# Patient Record
Sex: MALE | Race: WHITE | Employment: PART TIME | URBAN - METROPOLITAN AREA
[De-identification: names, ages, dates, MRNs, and addresses within clinical notes are randomized per-mention and may not be internally consistent; named-entity substitution may affect disease eponyms.]

---

## 2018-10-13 ENCOUNTER — HOSPITAL ENCOUNTER (EMERGENCY)
Age: 28
Discharge: HOME | End: 2018-10-13
Attending: EMERGENCY MEDICINE | Admitting: EMERGENCY MEDICINE
Payer: SELF-PAY

## 2018-10-13 VITALS
TEMPERATURE: 98.2 F | OXYGEN SATURATION: 99 % | HEART RATE: 88 BPM | RESPIRATION RATE: 16 BRPM | DIASTOLIC BLOOD PRESSURE: 68 MMHG | SYSTOLIC BLOOD PRESSURE: 115 MMHG | WEIGHT: 140 LBS

## 2018-10-13 DIAGNOSIS — K64.9 HEMORRHOIDS, UNSPECIFIED HEMORRHOID TYPE: Primary | ICD-10-CM

## 2018-10-13 PROCEDURE — 99282 EMERGENCY DEPT VISIT SF MDM: CPT

## 2018-10-13 RX ORDER — TRAMADOL HYDROCHLORIDE 50 MG/1
50 TABLET ORAL
Qty: 5 TAB | Refills: 0 | Status: SHIPPED | OUTPATIENT
Start: 2018-10-13 | End: 2020-07-17

## 2018-10-13 RX ORDER — HYDROCORTISONE 10 MG/G
CREAM TOPICAL AS NEEDED
Qty: 30 G | Refills: 0 | Status: SHIPPED | OUTPATIENT
Start: 2018-10-13 | End: 2020-07-17

## 2018-10-13 RX ORDER — DOCUSATE SODIUM 100 MG/1
100 CAPSULE, LIQUID FILLED ORAL 2 TIMES DAILY
Qty: 20 CAP | Refills: 0 | Status: SHIPPED | OUTPATIENT
Start: 2018-10-13 | End: 2018-10-23

## 2018-10-13 NOTE — DISCHARGE INSTRUCTIONS

## 2018-10-13 NOTE — ED TRIAGE NOTES
Pt states he has been dealing with a hemmorhiod for over a month now,  Having pain,  Painful to sit,  And has seen blood in his stool

## 2018-10-13 NOTE — ED PROVIDER NOTES
EMERGENCY DEPARTMENT HISTORY AND PHYSICAL EXAM 
 
2:37 AM 
 
 
Date: 10/13/2018 Patient Name: Esme Carey History of Presenting Illness Chief Complaint Patient presents with  Hemorrhoids History Provided By: Patient Chief Complaint: Hemorrhoids Duration:  1 Month Timing:  Constant and worsening Location:  Rectum Severity: Moderate Modifying Factors: Not improved by Ibuprofen and Preparation H with Lidocaine Associated Symptoms: Patient denies any other associated symptoms or complaints. Additional History (Context): Esme Carey is a 33 y.o. male with no significant pertinent past medical history who presents with c/o hemorrhoids onset 1 month ago. Patient describes his hemorrhoids as constant, worsening, located in the rectum, moderate, and not improved by Ibuprofen and Preparation H with Lidocaine. He has pain when sitting down and has seen blood in his stool. He has never had this before. Patient denies use of daily medications, alcohol use, and recreational drug use. He does smoke cigarettes. Patient has been mostly eating fast food and does not take any stool softeners. He says he usually has hard BMs and has to strain a lot when defecating. He frequently sees small amounts of bright red blood in the toilet bowl. Patient denies any other associated symptoms or complaints. PCP: None Current Outpatient Prescriptions Medication Sig Dispense Refill  hydrocortisone (PROCTOCORT) 1 % rectal cream Insert  into rectum as needed for Hemorrhoids. No more than twice per day. 30 g 0  
 docusate sodium (COLACE) 100 mg capsule Take 1 Cap by mouth two (2) times a day for 10 days. 20 Cap 0  
 traMADol (ULTRAM) 50 mg tablet Take 1 Tab by mouth every eight (8) hours as needed for Pain. Max Daily Amount: 150 mg. 5 Tab 0 Past History Past Medical History: 
History reviewed. No pertinent past medical history. Past Surgical History: History reviewed. No pertinent surgical history. Family History: 
History reviewed. No pertinent family history. Social History: 
Social History Substance Use Topics  Smoking status: Current Every Day Smoker  Smokeless tobacco: Never Used  Alcohol use Yes Allergies: Allergies Allergen Reactions  Pcn [Penicillins] Hives Review of Systems Review of Systems Constitutional: Negative for activity change, fatigue and fever. HENT: Negative for congestion and rhinorrhea. Eyes: Negative for visual disturbance. Respiratory: Negative for shortness of breath. Cardiovascular: Negative for chest pain and palpitations. Gastrointestinal: Positive for anal bleeding. Negative for abdominal pain, diarrhea, nausea and vomiting. Genitourinary: Negative for dysuria and hematuria. Musculoskeletal: Negative for back pain. Skin: Negative for rash. Neurological: Negative for dizziness, weakness and light-headedness. All other systems reviewed and are negative. Physical Exam  
 
Visit Vitals  /68 (BP 1 Location: Left arm)  Pulse 88  Temp 98.2 °F (36.8 °C)  Resp 16  Wt 63.5 kg (140 lb)  SpO2 99% Physical Exam  
Constitutional: He appears well-developed and well-nourished. He appears distressed. HENT:  
Head: Normocephalic and atraumatic. Cardiovascular: Normal rate and regular rhythm. Pulmonary/Chest: Breath sounds normal.  
Abdominal: Soft. He exhibits no distension. There is no tenderness. Genitourinary: Rectal exam shows no external hemorrhoid and no fissure. Genitourinary Comments: No visible external hemorrhoid. Digital exam deferred due to patient discomfort Musculoskeletal: He exhibits no edema or deformity. Diagnostic Study Results Labs - No results found for this or any previous visit (from the past 12 hour(s)). Radiologic Studies - No orders to display No results found. Medical Decision Making I am the first provider for this patient. I reviewed the vital signs, available nursing notes, past medical history, past surgical history, family history and social history. Vital Signs-Reviewed the patient's vital signs. Pulse Oximetry Analysis -  99% on room air (normal) Records Reviewed: Nursing Notes (Time of Review: 2:37 AM) ED Course: Progress Notes, Reevaluation, and Consults: MDM -- suspect internal hemorrhoids based on history. No visible external hemorrhoid. Advised on need to adhere to high fiber diet and fluid intake. Will add stool softener and topical steroid to his regimen, as well as Tramadol for severe breakthrough pain. Referred to colorectal surgery for further eval, per patient's request. 
 
Diagnosis Clinical Impression: 1. Hemorrhoids, unspecified hemorrhoid type Disposition: discharge Follow-up Information Follow up With Details Comments Contact Info Andie Presley MD Schedule an appointment as soon as possible for a visit This is a referral to a colorectal surgeon for further evaluation [address] 
[phone] Patient's Medications Start Taking DOCUSATE SODIUM (COLACE) 100 MG CAPSULE    Take 1 Cap by mouth two (2) times a day for 10 days. HYDROCORTISONE (PROCTOCORT) 1 % RECTAL CREAM    Insert  into rectum as needed for Hemorrhoids. No more than twice per day. TRAMADOL (ULTRAM) 50 MG TABLET    Take 1 Tab by mouth every eight (8) hours as needed for Pain. Max Daily Amount: 150 mg. Continue Taking No medications on file These Medications have changed No medications on file Stop Taking No medications on file  
 
_______________________________ Attestations: 
Scribe Attestation Amada Quinones acting as a scribe for and in the presence of Gena Castro MD     
October 13, 2018 at 2:37 AM 
    
Provider Attestation: I personally performed the services described in the documentation, reviewed the documentation, as recorded by the scribe in my presence, and it accurately and completely records my words and actions. October 13, 2018 at 2:37 AM - Francisco Lopez MD   
_______________________________

## 2019-01-15 ENCOUNTER — HOSPITAL ENCOUNTER (EMERGENCY)
Age: 29
Discharge: HOME | End: 2019-01-15
Attending: EMERGENCY MEDICINE
Payer: COMMERCIAL

## 2019-01-15 VITALS
DIASTOLIC BLOOD PRESSURE: 80 MMHG | OXYGEN SATURATION: 97 % | TEMPERATURE: 98.5 F | HEART RATE: 88 BPM | SYSTOLIC BLOOD PRESSURE: 123 MMHG | RESPIRATION RATE: 18 BRPM

## 2019-01-15 DIAGNOSIS — B35.6 JOCK ITCH: ICD-10-CM

## 2019-01-15 DIAGNOSIS — J02.9 SORE THROAT: Primary | ICD-10-CM

## 2019-01-15 PROCEDURE — 99282 EMERGENCY DEPT VISIT SF MDM: CPT

## 2019-01-15 PROCEDURE — 87081 CULTURE SCREEN ONLY: CPT

## 2019-01-15 RX ORDER — ECONAZOLE NITRATE 10 MG/G
CREAM TOPICAL DAILY
Qty: 15 G | Refills: 0 | Status: SHIPPED | OUTPATIENT
Start: 2019-01-15 | End: 2019-01-29

## 2019-01-15 NOTE — DISCHARGE INSTRUCTIONS
Patient Education        Sore Throat: Care Instructions  Your Care Instructions    Infection by bacteria or a virus causes most sore throats. Cigarette smoke, dry air, air pollution, allergies, and yelling can also cause a sore throat. Sore throats can be painful and annoying. Fortunately, most sore throats go away on their own. If you have a bacterial infection, your doctor may prescribe antibiotics. Follow-up care is a key part of your treatment and safety. Be sure to make and go to all appointments, and call your doctor if you are having problems. It's also a good idea to know your test results and keep a list of the medicines you take. How can you care for yourself at home? · If your doctor prescribed antibiotics, take them as directed. Do not stop taking them just because you feel better. You need to take the full course of antibiotics. · Gargle with warm salt water once an hour to help reduce swelling and relieve discomfort. Use 1 teaspoon of salt mixed in 1 cup of warm water. · Take an over-the-counter pain medicine, such as acetaminophen (Tylenol), ibuprofen (Advil, Motrin), or naproxen (Aleve). Read and follow all instructions on the label. · Be careful when taking over-the-counter cold or flu medicines and Tylenol at the same time. Many of these medicines have acetaminophen, which is Tylenol. Read the labels to make sure that you are not taking more than the recommended dose. Too much acetaminophen (Tylenol) can be harmful. · Drink plenty of fluids. Fluids may help soothe an irritated throat. Hot fluids, such as tea or soup, may help decrease throat pain. · Use over-the-counter throat lozenges to soothe pain. Regular cough drops or hard candy may also help. These should not be given to young children because of the risk of choking. · Do not smoke or allow others to smoke around you. If you need help quitting, talk to your doctor about stop-smoking programs and medicines.  These can increase your chances of quitting for good. · Use a vaporizer or humidifier to add moisture to your bedroom. Follow the directions for cleaning the machine. When should you call for help? Call your doctor now or seek immediate medical care if:    · You have new or worse trouble swallowing.     · Your sore throat gets much worse on one side.    Watch closely for changes in your health, and be sure to contact your doctor if you do not get better as expected. Where can you learn more? Go to http://kat-ruben.info/. Enter [phone] [id number] [phone] in the search box to learn more about \"Sore Throat: Care Instructions. \"  Current as of: 2018  Content Version: 11.8  © 7338-5292 [employer]. Care instructions adapted under license by [employer] (which disclaims liability or warranty for this information). If you have questions about a medical condition or this instruction, always ask your healthcare professional. [place] [address] any warranty or liability for your use of this information. [employer] Activation    Thank you for requesting access to [employer]. Please follow the instructions below to securely access and download your online medical record. [employer] allows you to send messages to your doctor, view your test results, renew your prescriptions, schedule appointments, and more. How Do I Sign Up? 1. In your internet browser, go to [email or web address]  2. Click on the First Time User? Click Here link in the Sign In box. You will be redirect to the New Member Sign Up page. 3. Enter your [employer] Access Code exactly as it appears below. You will not need to use this code after youve completed the sign-up process. If you do not sign up before the expiration date, you must request a new code. [employer] Access Code: [email or web address]  Expires: 3/1/2019  1:37 AM (This is the date your [employer]ter access code will )    4.  Enter the last four digits of your Social Security Number (xxxx) and Date of Birth (mm/dd/yyyy) as indicated and click Submit. You will be taken to the next sign-up page. 5. Create a [employer] ID. This will be your [employer] login ID and cannot be changed, so think of one that is secure and easy to remember. 6. Create a [employer] password. You can change your password at any time. 7. Enter your Password Reset Question and Answer. This can be used at a later time if you forget your password. 8. Enter your e-mail address. You will receive e-mail notification when new information is available in [address] 9. Click Sign Up. You can now view and download portions of your medical record. 10. Click the Download Summary menu link to download a portable copy of your medical information. Additional Information    If you have questions, please visit the Frequently Asked Questions section of the [employer] website at [email or web address] Remember, [employer] is NOT to be used for urgent needs. For medical emergencies, dial 911. Complete all medications as prescribed. Follow-up with primary care doctor in 1 week. Return to the ED immediately for any new or worsening symptoms.

## 2019-01-15 NOTE — ED NOTES
I have reviewed discharge instructions with the patient. The patient verbalized understanding. No further questions at this time.

## 2019-01-15 NOTE — ED PROVIDER NOTES
EMERGENCY DEPARTMENT HISTORY AND PHYSICAL EXAM 
 
Date: 1/15/2019 Patient Name: Chandana Guerrero History of Presenting Illness Chief Complaint Patient presents with  Sore Throat  Rash History Provided By: patient Chief Complaint: sore throat Duration: 3 weeks Timing: acute Location: throat Quality: aching Severity: moderate Modifying Factors: none Associated Symptoms: rash to the genital area x several days Additional History (Context): Chandana Guerrero is a 33 y.o. male with no PMH who presents with complaints of a sore throat x a 3 weeks and a rash to the genital region x several days that he believes is jock itch. He reports using OTC meds with no relief in sx. Denies any known STD exposure. No other complaints. PCP: None Current Outpatient Medications Medication Sig Dispense Refill  econazole nitrate (SPECTAZOLE) 1 % topical cream Apply  to affected area daily for 14 days. 15 g 0  
 hydrocortisone (PROCTOCORT) 1 % rectal cream Insert  into rectum as needed for Hemorrhoids. No more than twice per day. 30 g 0  
 traMADol (ULTRAM) 50 mg tablet Take 1 Tab by mouth every eight (8) hours as needed for Pain. Max Daily Amount: 150 mg. 5 Tab 0 Past History Past Medical History: 
History reviewed. No pertinent past medical history. Past Surgical History: 
History reviewed. No pertinent surgical history. Family History: 
History reviewed. No pertinent family history. Social History: 
Social History Tobacco Use  Smoking status: Current Every Day Smoker Packs/day: 0.25  Smokeless tobacco: Never Used Substance Use Topics  Alcohol use: Yes  Drug use: Not on file Allergies: Allergies Allergen Reactions  Pcn [Penicillins] Hives Review of Systems Review of Systems Constitutional: Negative. Negative for chills and fever. HENT: Positive for sore throat. Negative for congestion, ear pain and rhinorrhea. Eyes: Negative. Negative for pain and redness. Respiratory: Negative. Negative for cough, shortness of breath, wheezing and stridor. Cardiovascular: Negative. Negative for chest pain and leg swelling. Gastrointestinal: Negative. Negative for abdominal pain, constipation, diarrhea, nausea and vomiting. Genitourinary: Negative. Negative for dysuria and frequency. Musculoskeletal: Negative. Negative for back pain and neck pain. Skin: Positive for rash. Negative for wound. Neurological: Negative. Negative for dizziness, seizures, syncope and headaches. All other systems reviewed and are negative. All Other Systems Negative Physical Exam  
 
Vitals:  
 01/15/19 0151 BP: 123/80 Pulse: 88 Resp: 18 Temp: 98.5 °F (36.9 °C) SpO2: 97% Physical Exam  
Constitutional: He is oriented to person, place, and time. He appears well-developed and well-nourished. No distress. HENT:  
Head: Normocephalic and atraumatic. Mouth/Throat: Oropharynx is clear and moist. No oropharyngeal exudate. Eyes: Conjunctivae are normal. Right eye exhibits no discharge. Left eye exhibits no discharge. No scleral icterus. Neck: Normal range of motion. Neck supple. Cardiovascular: Normal rate, regular rhythm and normal heart sounds. Exam reveals no gallop and no friction rub. No murmur heard. Pulmonary/Chest: Effort normal and breath sounds normal. No stridor. No respiratory distress. He has no wheezes. He has no rales. Genitourinary: No penile tenderness. Genitourinary Comments: Erythematous circumferential patches noted over the pubic area, penis, and scrotum Musculoskeletal: Normal range of motion. Neurological: He is alert and oriented to person, place, and time. Coordination normal.  
Gait is steady. Able to ambulate without difficulty. Skin: Skin is warm and dry. No rash noted. He is not diaphoretic. No erythema. Psychiatric: He has a normal mood and affect.  His behavior is normal. Thought content normal.  
Nursing note and vitals reviewed. Diagnostic Study Results Labs - Recent Results (from the past 12 hour(s)) STREP THROAT SCREEN Collection Time: 01/15/19  1:54 AM  
Result Value Ref Range Special Requests: NO SPECIAL REQUESTS Strep Screen NEGATIVE Culture result: PENDING Radiologic Studies - No orders to display CT Results  (Last 48 hours) None CXR Results  (Last 48 hours) None Medical Decision Making I am the first provider for this patient. I reviewed the vital signs, available nursing notes, past medical history, past surgical history, family history and social history. Vital Signs-Reviewed the patient's vital signs. Records Reviewed: Nuria Mei PA-C 2:22 AM  
 
Procedures: 
Procedures Provider Notes (Medical Decision Making): Impression:  Sore throat, jock itch Will d/c pt with econazole and recommend PCP follow-up. MED RECONCILIATION: 
No current facility-administered medications for this encounter. Current Outpatient Medications Medication Sig  econazole nitrate (SPECTAZOLE) 1 % topical cream Apply  to affected area daily for 14 days.  hydrocortisone (PROCTOCORT) 1 % rectal cream Insert  into rectum as needed for Hemorrhoids. No more than twice per day.  traMADol (ULTRAM) 50 mg tablet Take 1 Tab by mouth every eight (8) hours as needed for Pain. Max Daily Amount: 150 mg. Disposition: 
D.c DISCHARGE NOTE:  
Patient is stable for discharge at this time. Rx for econazole given. Rest and follow-up with PCP this week. Return to the ED immediately for any new or worsening sx. Nuria Mei PA-C 2:48 AM  
 
Follow-up Information Follow up With Specialties Details Why Contact Giulia Hamilton Schedule an appointment as soon as possible for a visit in 1 week  [address] [facility] [address]  [id number] 
[phone] [facility] EMERGENCY DEPT Emergency Medicine  As needed [address] [phone] Current Discharge Medication List  
  
START taking these medications Details  
econazole nitrate (SPECTAZOLE) 1 % topical cream Apply  to affected area daily for 14 days. Qty: 15 g, Refills: 0 Diagnosis Clinical Impression: 1. Sore throat 2. Jock itch

## 2019-01-15 NOTE — ED TRIAGE NOTES
Pt states he has had a sore throat for the last 3 weeks. Pt states he has tried OTC medications and nothing is working. Pt states he also needs to be seen for a rash in his genital area. Pt has had a rash for about 2 weeks.

## 2019-01-16 LAB
B-HEM STREP THROAT QL CULT: NEGATIVE
BACTERIA SPEC CULT: NORMAL
SERVICE CMNT-IMP: NORMAL

## 2020-07-17 ENCOUNTER — HOSPITAL ENCOUNTER (EMERGENCY)
Age: 30
Discharge: HOME | End: 2020-07-18
Attending: EMERGENCY MEDICINE | Admitting: EMERGENCY MEDICINE
Payer: COMMERCIAL

## 2020-07-17 ENCOUNTER — APPOINTMENT (OUTPATIENT)
Dept: GENERAL RADIOLOGY | Age: 30
End: 2020-07-17
Attending: NURSE PRACTITIONER
Payer: COMMERCIAL

## 2020-07-17 VITALS
WEIGHT: 140 LBS | TEMPERATURE: 98.2 F | RESPIRATION RATE: 16 BRPM | HEART RATE: 71 BPM | OXYGEN SATURATION: 96 % | SYSTOLIC BLOOD PRESSURE: 117 MMHG | DIASTOLIC BLOOD PRESSURE: 71 MMHG

## 2020-07-17 DIAGNOSIS — S91.311A LACERATION OF RIGHT FOOT, INITIAL ENCOUNTER: Primary | ICD-10-CM

## 2020-07-17 PROCEDURE — 73630 X-RAY EXAM OF FOOT: CPT

## 2020-07-17 PROCEDURE — 75810000293 HC SIMP/SUPERF WND  RPR

## 2020-07-17 PROCEDURE — 90471 IMMUNIZATION ADMIN: CPT

## 2020-07-17 PROCEDURE — 99282 EMERGENCY DEPT VISIT SF MDM: CPT

## 2020-07-17 NOTE — LETTER
NOTIFICATION RETURN TO WORK / SCHOOL 
 
7/18/2020 12:04 AM 
 
Mr. Adithya Alvarado [id number] To Whom It May Concern: 
 
Huma Parkash is currently under the care of [facility] EMERGENCY DEPT. He will return to work/school on  7- If there are questions or concerns please have the patient contact our office. Sincerely, Katarina RAMÍREZ, BRITTANY-C

## 2020-07-18 PROCEDURE — 90715 TDAP VACCINE 7 YRS/> IM: CPT | Performed by: NURSE PRACTITIONER

## 2020-07-18 PROCEDURE — 75810000293 HC SIMP/SUPERF WND  RPR

## 2020-07-18 PROCEDURE — 74011250636 HC RX REV CODE- 250/636: Performed by: NURSE PRACTITIONER

## 2020-07-18 PROCEDURE — 90471 IMMUNIZATION ADMIN: CPT

## 2020-07-18 RX ORDER — CEPHALEXIN 500 MG/1
500 CAPSULE ORAL 4 TIMES DAILY
Qty: 28 CAP | Refills: 0 | Status: SHIPPED | OUTPATIENT
Start: 2020-07-18 | End: 2020-07-25

## 2020-07-18 RX ADMIN — TETANUS TOXOID, REDUCED DIPHTHERIA TOXOID AND ACELLULAR PERTUSSIS VACCINE, ADSORBED 0.5 ML: 5; 2.5; 8; 8; 2.5 SUSPENSION INTRAMUSCULAR at 00:18

## 2020-07-18 NOTE — ED PROVIDER NOTES
[facility]  Emergency Department Treatment Report        10:33 PM Sharron Estrella is a 31 y.o. male  who presents to ED with a right foot injury. Patient works at [employer] and he moved by a knife and's fell off of the counter and onto his foot and went into the dorsal area of his foot. Patient states he is not up-to-date on his tetanus. A dressing is in place. No other complaints, associated symptoms or modifying factors at this time. PCP: None      The history is provided by the patient. No  was used. Laceration    The incident occurred 1 to 2 hours ago. The laceration is 1 cm in size. The injury mechanism is a clean knife. Foreign body present: no. The pain is mild. Pertinent negatives include no numbness. It is unknown when the patient last had a tetanus shot. History reviewed. No pertinent past medical history. History reviewed. No pertinent surgical history. History reviewed. No pertinent family history. Social History     Socioeconomic History    Marital status: SINGLE     Spouse name: Not on file    Number of children: Not on file    Years of education: Not on file    Highest education level: Not on file   Occupational History    Not on file   Social Needs    Financial resource strain: Not on file    Food insecurity     Worry: Not on file     Inability: Not on file    Transportation needs     Medical: Not on file     Non-medical: Not on file   Tobacco Use    Smoking status: Current Every Day Smoker     Packs/day: 0.25    Smokeless tobacco: Never Used   Substance and Sexual Activity    Alcohol use:  Yes    Drug use: Not on file    Sexual activity: Not on file   Lifestyle    Physical activity     Days per week: Not on file     Minutes per session: Not on file    Stress: Not on file   Relationships    Social connections     Talks on phone: Not on file     Gets together: Not on file     Attends [employer] service: Not on file     Active member of club or organization: Not on file     Attends meetings of clubs or organizations: Not on file     Relationship status: Not on file    Intimate partner violence     Fear of current or ex partner: Not on file     Emotionally abused: Not on file     Physically abused: Not on file     Forced sexual activity: Not on file   Other Topics Concern    Not on file   Social History Narrative    Not on file         ALLERGIES: Pcn [penicillins]    Review of Systems   Constitutional: Negative for fever. Respiratory: Negative for chest tightness. Skin: Positive for wound. Negative for color change. Neurological: Negative for dizziness and numbness. All other systems reviewed and are negative. Vitals:    07/17/20 2022   BP: 117/71   Pulse: 71   Resp: 16   Temp: 98.2 °F (36.8 °C)   SpO2: 96%   Weight: 63.5 kg (140 lb)            Physical Exam  Vitals signs and nursing note reviewed. Constitutional:       General: He is not in acute distress. Appearance: He is well-developed. HENT:      Head: Normocephalic and atraumatic. Eyes:      Conjunctiva/sclera: Conjunctivae normal.      Pupils: Pupils are equal, round, and reactive to light. Neck:      Musculoskeletal: Normal range of motion and neck supple. Cardiovascular:      Rate and Rhythm: Normal rate and regular rhythm. Pulmonary:      Effort: Pulmonary effort is normal. No respiratory distress. Breath sounds: Normal breath sounds. Abdominal:      General: Bowel sounds are normal.      Palpations: Abdomen is soft. Tenderness: There is no abdominal tenderness. Musculoskeletal: Normal range of motion. General: No deformity. Skin:     General: Skin is warm and dry. Capillary Refill: Capillary refill takes less than 2 seconds. Findings: No erythema or rash. Comments: +1 cm laceration to the dorsal side of his foot proximal to his toes between the fourth and fifth digit. Bleeding is controlled.   Normal distal circulation and sensation and range of motion to the toes. Neurological:      Mental Status: He is alert and oriented to person, place, and time. Cranial Nerves: No cranial nerve deficit. Psychiatric:         Behavior: Behavior normal.         Thought Content: Thought content normal.         Judgment: Judgment normal.          MDM  Number of Diagnoses or Management Options  Laceration of right foot, initial encounter:   Diagnosis management comments: I will obtain an x-ray of his right foot and then suture the laceration. Probably place patient on antibiotics. Wound was closed without difficulty x-ray showed no foreign body and no bony injury. Patient tolerated procedure well given a tetanus shot while here in the emergency department Keflex sent to pharmacy. No other acute illnesses suspected patient discharged home in no distress. Amount and/or Complexity of Data Reviewed  Tests in the radiology section of CPT®: ordered and reviewed  Review and summarize past medical records: yes  Independent visualization of images, tracings, or specimens: yes    Risk of Complications, Morbidity, and/or Mortality  Presenting problems: low  Diagnostic procedures: low  Management options: low    Patient Progress  Patient progress: improved         Wound Closure by Adhesive    Date/Time: 7/18/2020 12:18 AM  Performed by: Paul Page NP  Authorized by: Paul Page NP     Consent:     Consent obtained:  Verbal    Consent given by:  Patient    Risks discussed:  Infection, need for additional repair, nerve damage, pain, poor cosmetic result, poor wound healing, retained foreign body, tendon damage and vascular damage    Alternatives discussed:  No treatment  Anesthesia (see MAR for exact dosages):      Anesthesia method:  Local infiltration    Local anesthetic:  Lidocaine 1% w/o epi  Laceration details:     Location:  Foot    Foot location:  Top of R foot    Length (cm):  1    Depth (mm): 2  Repair type:     Repair type:  Simple  Pre-procedure details:     Preparation:  Patient was prepped and draped in usual sterile fashion  Exploration:     Wound exploration: wound explored through full range of motion      Wound extent: areolar tissue violated      Contaminated: no    Treatment:     Area cleansed with:  Betadine and saline    Amount of cleaning:  Standard    Irrigation solution:  Sterile saline    Irrigation method:  Syringe    Visualized foreign bodies/material removed: no    Skin repair:     Repair method:  Sutures    Suture size:  4-0    Suture material:  Nylon    Suture technique:  Simple interrupted    Number of sutures:  2  Approximation:     Approximation:  Close  Post-procedure details:     Dressing:  Antibiotic ointment, non-adherent dressing and adhesive bandage    Patient tolerance of procedure: Tolerated well, no immediate complications              Vitals:  Patient Vitals for the past 12 hrs:   Temp Pulse Resp BP SpO2   07/17/20 2022 98.2 °F (36.8 °C) 71 16 117/71 96 %       Medications ordered:   Medications   diph,Pertuss(AC),Tet Vac-PF (BOOSTRIX) suspension 0.5 mL (0.5 mL IntraMUSCular Given 7/18/20 0018)        X-Ray, CT or other radiology findings or impressions:  XR FOOT RT MIN 3 V    (Results Pending)     No acute finding per my read          Disposition:    Diagnosis:   1. Laceration of right foot, initial encounter        Disposition: to Home      Follow-up Information     Follow up With Specialties Details Why [address] [facility] EMERGENCY DEPT Emergency Medicine In 10 days For suture removal [address] [facility]  [phone]           Patient's Medications   Start Taking    CEPHALEXIN (KEFLEX) 500 MG CAPSULE    Take 1 Cap by mouth four (4) times daily for 7 days.    Continue Taking    No medications on file   These Medications have changed    No medications on file Stop Taking    HYDROCORTISONE (PROCTOCORT) 1 % RECTAL CREAM    Insert  into rectum as needed for Hemorrhoids. No more than twice per day. TRAMADOL (ULTRAM) 50 MG TABLET    Take 1 Tab by mouth every eight (8) hours as needed for Pain. Max Daily Amount: 150 mg. Return to the ER if you are unable to obtain referral as directed. Ever Patel  results have been reviewed with him. He has been counseled regarding his diagnosis, treatment, and plan. He verbally conveys understanding and agreement of the signs, symptoms, diagnosis, treatment and prognosis and additionally agrees to follow up as discussed. He also agrees with the care-plan and conveys that all of his questions have been answered. I have also provided discharge instructions for him that include: educational information regarding their diagnosis and treatment, and list of reasons why they would want to return to the ED prior to their follow-up appointment, should his condition change. Wilber Parekh ENP-C,FNP-C      Dragon voice recognition was used to generate this report, which may have resulted in some phonetic based errors in grammar and contents.  Even though attempts were made to correct all the mistakes, some may have been missed, and remained in the body of the document

## 2020-07-29 ENCOUNTER — HOSPITAL ENCOUNTER (EMERGENCY)
Age: 30
Discharge: HOME | End: 2020-07-29
Attending: EMERGENCY MEDICINE
Payer: COMMERCIAL

## 2020-07-29 VITALS
SYSTOLIC BLOOD PRESSURE: 107 MMHG | TEMPERATURE: 97.5 F | DIASTOLIC BLOOD PRESSURE: 64 MMHG | HEART RATE: 82 BPM | RESPIRATION RATE: 20 BRPM | OXYGEN SATURATION: 100 %

## 2020-07-29 DIAGNOSIS — Z48.02 VISIT FOR SUTURE REMOVAL: Primary | ICD-10-CM

## 2020-07-29 PROCEDURE — 75810000275 HC EMERGENCY DEPT VISIT NO LEVEL OF CARE

## 2020-07-29 RX ORDER — SIMVASTATIN 10 MG/1
10 TABLET, FILM COATED ORAL
Qty: 30 TAB | Refills: 0 | Status: SHIPPED | OUTPATIENT
Start: 2020-07-29

## 2020-07-29 RX ORDER — NITROGLYCERIN 0.4 MG/1
0.4 TABLET SUBLINGUAL
Qty: 1 BOTTLE | Refills: 0 | Status: SHIPPED | OUTPATIENT
Start: 2020-07-29

## 2020-07-29 NOTE — ED NOTES
I have reviewed discharge instructions with the patient. The patient verbalized understanding.   Pt left  Ed in stable condition

## 2020-07-29 NOTE — DISCHARGE INSTRUCTIONS
Patient Education        Learning About Stitches and Staples Removal  When are stitches and staples removed? Your doctor will tell you when to have your stitches or staples removed, usually in 7 to 14 days. How long you'll be told to wait will depend on things like where the wound is located, how big and how deep the wound is, and what your general health is like. Do not remove the stitches on your own. Stitches on the face are usually removed within a week. But stitches and staples on other areas of the body, such as on the back or belly or over a joint, may need to stay in place longer, often a week or two. Be sure to follow your doctor's instructions. How are stitches and staples removed? It usually doesn't hurt when the doctor removes the stitches or staples. You may feel a tug as each stitch or staple is removed. · You will either be seated or lying down. · To remove stitches, the doctor will use scissors to cut each of the knots and then pull the threads out. · To remove staples, the doctor will use a tool to take out the staples one at a time. · The area may still feel tender after the stitches or staples are gone. But it should feel better within a few minutes or up to a few hours. What can you expect after stitches and staples are removed? Depending on the type and location of the cut, you will have a scar. Scars usually fade over time. Keep the area clean, but you won't need a bandage. When should you call for help? Call your doctor now or seek immediate medical care if :  · You have new pain, or your pain gets worse. · You have trouble moving the area near the scar. · You have symptoms of infection, such as:  ? Increased pain, swelling, warmth, or redness around the scar. ? Red streaks leading from the scar. ? Pus draining from the scar. ? A fever. Watch closely for changes in your health, and be sure to contact your doctor if:   · The scar opens.   · You do not get better as expected. Follow-up care is a key part of your treatment and safety. Be sure to make and go to all appointments, and call your doctor if you do not get better as expected. It's also a good idea to keep a list of the medicines you take. Where can you learn more? Go to http://www.gray.com/  Enter L659 in the search box to learn more about \"Learning About Stitches and Staples Removal.\"  Current as of: 2019               Content Version: 12.5  © 1719-9209 [employer]. Care instructions adapted under license by [employer] (which disclaims liability or warranty for this information). If you have questions about a medical condition or this instruction, always ask your healthcare professional. [address] any warranty or liability for your use of this information. [employer] Activation    Thank you for requesting access to [employer]. Please follow the instructions below to securely access and download your online medical record. [employer] allows you to send messages to your doctor, view your test results, renew your prescriptions, schedule appointments, and more. How Do I Sign Up? 1. In your internet browser, go to [email or web address]  2. Click on the First Time User? Click Here link in the Sign In box. You will be redirect to the New Member Sign Up page. 3. Enter your [employer] Access Code exactly as it appears below. You will not need to use this code after youve completed the sign-up process. If you do not sign up before the expiration date, you must request a new code. [employer] Access Code: [email or web address]  Expires: 2020 10:36 PM (This is the date your [employer] access code will )    4. Enter the last four digits of your Social Security Number (xxxx) and Date of Birth (mm/dd/yyyy) as indicated and click Submit. You will be taken to the next sign-up page. 5. Create a [employer] ID.  This will be your [employer] login ID and cannot be changed, so think of one that is secure and easy to remember. 6. Create a [employer] password. You can change your password at any time. 7. Enter your Password Reset Question and Answer. This can be used at a later time if you forget your password. 8. Enter your e-mail address. You will receive e-mail notification when new information is available in [address] 9. Click Sign Up. You can now view and download portions of your medical record. 10. Click the Download Summary menu link to download a portable copy of your medical information. Additional Information    If you have questions, please visit the Frequently Asked Questions section of the [employer] website at [email or web address] Remember, [employer] is NOT to be used for urgent needs. For medical emergencies, dial 911.